# Patient Record
Sex: FEMALE | Race: WHITE | URBAN - METROPOLITAN AREA
[De-identification: names, ages, dates, MRNs, and addresses within clinical notes are randomized per-mention and may not be internally consistent; named-entity substitution may affect disease eponyms.]

---

## 2019-02-05 ENCOUNTER — OFFICE VISIT (OUTPATIENT)
Dept: FAMILY MEDICINE CLINIC | Age: 49
End: 2019-02-05

## 2019-02-05 VITALS
BODY MASS INDEX: 38.95 KG/M2 | OXYGEN SATURATION: 96 % | RESPIRATION RATE: 16 BRPM | SYSTOLIC BLOOD PRESSURE: 117 MMHG | WEIGHT: 263 LBS | TEMPERATURE: 99.3 F | HEART RATE: 107 BPM | DIASTOLIC BLOOD PRESSURE: 82 MMHG | HEIGHT: 69 IN

## 2019-02-05 DIAGNOSIS — R00.0 TACHYCARDIA: ICD-10-CM

## 2019-02-05 DIAGNOSIS — Z00.00 ANNUAL PHYSICAL EXAM: ICD-10-CM

## 2019-02-05 DIAGNOSIS — C50.911 MALIGNANT NEOPLASM OF RIGHT FEMALE BREAST, UNSPECIFIED ESTROGEN RECEPTOR STATUS, UNSPECIFIED SITE OF BREAST (HCC): ICD-10-CM

## 2019-02-05 DIAGNOSIS — F33.1 MODERATE EPISODE OF RECURRENT MAJOR DEPRESSIVE DISORDER (HCC): ICD-10-CM

## 2019-02-05 DIAGNOSIS — G43.109 MIGRAINE WITH AURA AND WITHOUT STATUS MIGRAINOSUS, NOT INTRACTABLE: Primary | ICD-10-CM

## 2019-02-05 RX ORDER — SUMATRIPTAN 20 MG/1
1 SPRAY NASAL AS NEEDED
Qty: 1 CONTAINER | Refills: 2 | Status: SHIPPED | OUTPATIENT
Start: 2019-02-05 | End: 2019-05-21

## 2019-02-05 RX ORDER — CITALOPRAM 10 MG/1
10 TABLET ORAL DAILY
Qty: 90 TAB | Refills: 0 | Status: SHIPPED | OUTPATIENT
Start: 2019-02-05 | End: 2019-04-25 | Stop reason: ALTCHOICE

## 2019-02-05 RX ORDER — TAMOXIFEN CITRATE 20 MG/1
20 TABLET ORAL DAILY
COMMUNITY

## 2019-02-05 NOTE — PROGRESS NOTES
Subjective  Noah Elizalde is an 57 y.o. female who presents for worsening headaches    HPI  Headaches  Duration: adolescence  Type: patient states that she has not been told which type  Location:  Left frontal radiating to back of neck  Severity: can be 9/10 at its worst, no headache right now  Character: sharp  Aura: reports seeing spots just prior to and during headache  Timing: can last up to 4 days, but usually only one day  Frequency: 1x per week  Setting: no particular setting  Triggers: weather changes  Treatment: advil and tylenol  Neurologist: has never been evaluated by neurology in the past    Hx of Breast Cancer  Diagnosed: Nov, 2012  Surgery, Chemo, Radiation  Specialist: followed by [facility], last seen less than 1 month ago (Dr. Ying Avalos)  Treatment: tamoxifen  Reoccurence: denies any hx of reoccurrence after tretament    Depression  Duration: 1 year  Medicine: not taking any medicine for this  Prior Medicines: bupropion in the past, but it was stopped d/t interaction with Tamoxifen. Patient states that she spoke with her oncologist, who stated that she could be on Celexa or Effexor  SI: denies any SI or HI  Symptoms: manifested by fellings of sadness or easily agitated  Effects on Life: affects work life; patient lives by herself  PHQ-9: 10 (moderate depression)  Social Support: patient feels that she has good social support from family (sisters)  Sexual Activity: patient is sexually active and uses paraguard IUD        Review of Systems   Constitutional: Negative for appetite change. Negative for activity change, chills, diaphoresis and fatigue. HENT: Negative for congestion and dental problem. Eyes: Negative for discharge. Respiratory: Negative for apnea and chest tightness. Cardiovascular: Negative for chest pain and leg swelling. Gastrointestinal: Negative for abdominal distention and abdominal pain. Genitourinary: Negative for difficulty urinating and dysuria. Musculoskeletal: Negative for arthralgias and back pain. Skin: Negative for color change and rash. Neurological: Negative for numbness. Positive for headaches. Hematological: Negative for adenopathy. Psychiatric/Behavioral: see HPI        Allergies - reviewed: Allergies   Allergen Reactions    Adhesive Unknown (comments)         Medications - reviewed:   Current Outpatient Medications   Medication Sig    tamoxifen (NOLVADEX) 20 mg tablet Take 20 mg by mouth daily.  SUMAtriptan (IMITREX) 20 mg/actuation nasal spray 1 [place] by Right Nostril route as needed for Migraine.  citalopram (CELEXA) 10 mg tablet Take 1 Tab by mouth daily. No current facility-administered medications for this visit. Past Medical History - reviewed:  Past Medical History:   Diagnosis Date    Breast cancer ([facility] [address] 2011    IN REMISSION    Papanicolaou smear 2013    DR LELO TREADWELL         Past Surgical History - reviewed:   Past Surgical History:   Procedure Laterality Date    HX BREAST LUMPECTOMY      RIGHT    HX CHOLECYSTECTOMY  2016    ND ANESTH, HEART SURG < AGE 3           Social History - reviewed:  Social History     Socioeconomic History    Marital status: SINGLE     Spouse name: Not on file    Number of children: Not on file    Years of education: Not on file    Highest education level: Not on file   Social Needs    Financial resource strain: Not on file    Food insecurity - worry: Not on file    Food insecurity - inability: Not on file   [employer] needs - medical: Not on file   [employer] needs - non-medical: Not on file   Occupational History    Not on file   Tobacco Use    Smoking status: Current Every Day Smoker    Smokeless tobacco: Never Used   Substance and Sexual Activity    Alcohol use:  Yes    Drug use: No    Sexual activity: Not on file   Other Topics Concern    Not on file   Social History Narrative    Not on file         Family History - reviewed:  Family History   Problem Relation Age of Onset    Hypertension Mother     Thyroid Disease Mother         hypothyroidism    Coronary Artery Disease Father     Diabetes Father          Visit Vitals  /82   Pulse (!) 107   Temp 99.3 °F (37.4 °C) (Oral)   Resp 16   Ht 5' 9\" (1.753 m)   Wt 263 lb (119.3 kg)   SpO2 96%   BMI 38.84 kg/m²       Physical Exam   Constitutional: well-developed and well-nourished. HENT:   Head: Normocephalic and atraumatic. Eyes: Conjunctivae are normal. Pupils are equal, round, and reactive to light. Neck: Normal range of motion. Neck supple. No JVD present. No tracheal deviation present. No thyromegaly present. Cardiovascular: Normal rate, regular rhythm and normal heart sounds. Exam reveals no friction rub. No murmur heard. Pulmonary/Chest: Effort normal and breath sounds normal. No stridor. No respiratory distress. no wheezes. no rales. no tenderness. Abdominal: Soft. Bowel sounds are normal. no distension and no mass. no tenderness. There is no rebound and no guarding. Musculoskeletal: Normal range of motion. no edema, tenderness or deformity. Lymphadenopathy:    no cervical adenopathy. Neurological: No cranial nerve deficit. Coordination normal.   Skin: Skin is warm and dry. No erythema. Psychiatric: normal mood and affect. Assessment/Plan  1. Migraine with aura and without status migrainosus, not intractable: no current headaches. Migraines have not responded to NSAID or tylenol in the past. Will escalate therapy with Imitrex.   - SUMAtriptan (IMITREX) 20 mg/actuation nasal spray; 1 [place] by Right Nostril route as needed for Migraine. Dispense: 1 Container; Refill: 2  - discussed reasons to call or go to ED    2. Moderate episode of recurrent major depressive disorder ([facility] [address]): stable no SI. Patient unable to stay on Bupropion d/t interaction with Tamoxifen, but no interaction with Celexa or Effexor. Will start Celexa and reassess in two weeks.     - citalopram (CELEXA) 10 mg tablet; Take 1 Tab by mouth daily. Dispense: 90 Tab; Refill: 0    3. Malignant neoplasm of right female breast, unspecified estrogen receptor status, unspecified site of breast ([facility] [address]): stable with no report of reoccurrence. - continue tamoxifen per [facility]  - f/u with [facility] oncology as scheduled    4. Annual physical exam: patient due for annual physical. Will obtain labs and RTC in 2 weeks for annual.   - CBC WITH AUTOMATED DIFF  - METABOLIC PANEL, COMPREHENSIVE  - LIPID PANEL    5. Tachycardia: patient with HR elevated at 107 on encounter today. Patient denies any CP, dizziness, syncope/presyncope, or palpitations. Review of prior records shows that patient has had slightly elevated HR in the past. Will continue to monitor with consideration of further workup based on symptoms. - discussed reasons to call or go to ED      Follow-up Disposition:  Return in about 2 weeks (around 2/19/2019) for annual physical.      I have discussed the diagnosis with the patient and the intended plan as seen in the above orders. Patient verbalized understanding of the plan and agrees with the plan. The patient has received an after-visit summary and questions were answered concerning future plans. I have discussed medication side effects and warnings with the patient as well. Informed patient to return to the office if new symptoms arise.         Daniel Neumann MD  Family Medicine Resident

## 2019-02-05 NOTE — PATIENT INSTRUCTIONS
Recurring Migraine Headache: Care Instructions  Your Care Instructions  Migraines are painful, throbbing headaches. They often start on one side of the head. They may cause nausea and vomiting and make you sensitive to light, sound, or smell. Some people may have only a few migraines throughout life. Others have them as often as several times a month. The goal of treatment is to reduce the number of migraines you have and relieve your symptoms. Even with treatment, you may continue to have migraines. You play an important role in dealing with your headaches. Work on avoiding things that seem to trigger your migraines. When you feel a headache coming on, act quickly to stop it before it gets worse. Follow-up care is a key part of your treatment and safety. Be sure to make and go to all appointments, and call your doctor if you are having problems. It's also a good idea to know your test results and keep a list of the medicines you take. How can you care for yourself at home? · Do not drive if you have taken a prescription pain medicine. · Rest in a quiet, dark room until your headache is gone. Close your eyes and try to relax or go to sleep. Do not watch TV or read. · Put a cold, moist cloth or cold pack on the painful area for 10 to 20 minutes at a time. Put a thin cloth between the cold pack and your skin. · Have someone gently massage your neck and shoulders. · Take your medicines exactly as prescribed. Call your doctor if you think you are having a problem with your medicine. You will get more details on the specific medicines your doctor prescribes. To prevent migraines  · Keep a headache diary so you can figure out what triggers your headaches. Avoiding triggers may help you prevent headaches. Record when each headache began, how long it lasted, and what the pain was like. Use words like throbbing, aching, stabbing, or dull. Write down any other symptoms you had with the headache.  These may include nausea, flashing lights or dark spots, or sensitivity to bright light or loud noise. Note if the headache occurred near your period. List anything that might have triggered the headache. Triggers may include certain foods (chocolate, cheese, wine) or odors, smoke, bright light, stress, or lack of sleep. · If your doctor has prescribed medicine for your migraines, take it as directed. You may have medicine that you take only when you get a migraine and medicine that you take all the time to help prevent migraines. ? If your doctor has prescribed medicine for when you get a headache, take it at the first sign of a migraine, unless your doctor has given you other instructions. ? If your doctor has prescribed medicine to prevent migraines, take it exactly as prescribed. Call your doctor if you think you are having a problem with your medicine. · Find healthy ways to deal with stress. Migraines are most common during or right after stressful times. Take time to relax before and after you do something that has caused a migraine in the past.  · Try to keep your muscles relaxed by keeping good posture. Check your jaw, face, neck, and shoulder muscles for tension. Try to relax them. When sitting at a desk, change positions often. Stretch for 30 seconds each hour. · Get regular sleep and exercise. · Eat regular meals, and avoid foods and drinks that often trigger migraines. These include chocolate and alcohol, especially red wine and port. Chemicals used in food, such as aspartame and monosodium glutamate (MSG), also can trigger migraines. So can some food additives, such as those found in hot dogs, bonilla, cold cuts, aged cheeses, and pickled foods. · Limit caffeine by not drinking too much coffee, tea, or soda. Do not quit caffeine suddenly, because that can also give you migraines. · Do not smoke or allow others to smoke around you.  If you need help quitting, talk to your doctor about stop-smoking programs and medicines. These can increase your chances of quitting for good. · If you are taking birth control pills or hormone therapy, talk to your doctor about whether they are triggering your migraines. When should you call for help? Call 911 anytime you think you may need emergency care. For example, call if:    · You have symptoms of a stroke. These may include:  ? Sudden numbness, tingling, weakness, or loss of movement in your face, arm, or leg, especially on only one side of your body. ? Sudden vision changes. ? Sudden trouble speaking. ? Sudden confusion or trouble understanding simple statements. ? Sudden problems with walking or balance. ? A sudden, severe headache that is different from past headaches.    Call your doctor now or seek immediate medical care if:    · You develop a fever and a stiff neck.     · You have new nausea and vomiting, or you cannot keep down food or liquids.    Watch closely for changes in your health, and be sure to contact your doctor if:    · You have a headache that does not get better within 1 or 2 days.     · Your headaches get worse or happen more often. Where can you learn more? Go to http://tashi-pamela.info/. Enter V975 in the search box to learn more about \"Recurring Migraine Headache: Care Instructions. \"  Current as of: Gris 3, 2018  Content Version: 11.9  © 2987-2001 [employer]. Care instructions adapted under license by [employer] (which disclaims liability or warranty for this information). If you have questions about a medical condition or this instruction, always ask your healthcare professional. [place] [address] any warranty or liability for your use of this information. Deciding About Taking Medicine to Prevent Migraines  How can you decide about taking medicine to prevent migraine headaches? What are migraines? Migraines are painful, throbbing headaches.  They can last from 4 to 67 hours. They often occur on only one side of your head. But you may feel them on both sides. The pain may keep you from doing your daily activities. You may take a daily medicine if you get bad migraines often. This can help prevent them. What are key points about this decision? · Medicines to prevent migraines may not stop them every time. But if you take them daily, you can reduce how many migraines you get by more than half. They can also reduce how long migraines last. And your symptoms may not be as bad. · Medicines that prevent migraines may cause side effects. You may have sleep and memory problems, upset stomach, dry mouth, or constipation. Some of these side effects may last for as long as you take the medicine. Or they may go away within a few weeks. Why might you choose to take medicine to prevent migraines? · You are willing to take medicine daily if it will help your symptoms. · You don't think the side effects of the medicine could be as bad as your migraine symptoms. · Your migraines get in the way of your work. Or they harm your relationships with friends and family. · Benefits of medicine include fewer or no migraines. And your migraines may not last as long or feel as bad. Why might you choose not to take medicine to prevent migraines? · You want to avoid the side effects of the medicine. · You don't want to take medicine every day. · Your migraines are not affecting your work and relationships. · If your symptoms don't improve with home treatment and other medicines, you can decide later to take medicine every day to help prevent migraines. Your decision  Thinking about the facts and your feelings can help you make a decision that is right for you. Be sure you understand the benefits and risks of your options, and think about what else you need to do before you make the decision. Where can you learn more? Go to http://tashi-pamela.info/.   Enter X329 in the search box to learn more about \"Deciding About Taking Medicine to Prevent Migraines. \"  Current as of: Gris 3, 2018  Content Version: 11.9  © 6990-8588 [employer]. Care instructions adapted under license by [employer] (which disclaims liability or warranty for this information). If you have questions about a medical condition or this instruction, always ask your healthcare professional. [address] any warranty or liability for your use of this information. Sumatriptan (By mouth)   Sumatriptan (laquita-ma-TRIP-tan)  Treats migraine headaches. Brand Name(s): Imitrex, Migraine Pack, Migranow   There may be other brand names for this medicine. When This Medicine Should Not Be Used: This medicine is not right for everyone. Do not use if you had an allergic reaction to sumatriptan. Tell your doctor if you have heart or blood vessel problems, such as a history of heart attack, stroke, or heart rhythm problems. How to Use This Medicine:   Tablet  · Your doctor will tell you how much medicine to use. Do not use more than directed. · Swallow the tablet whole with water or other liquids. Do not crush, break, or chew it. · If this medicine does not help your headache at all, do not take more medicine. Call your doctor. · If your headache comes back or you do not get complete relief, wait at least 2 hours before you use another dose. If you feel you need to use the medicine more than 2 times in a day, call your doctor. · Read and follow the patient instructions that come with this medicine. Talk to your doctor or pharmacist if you have any questions. · Use sumatriptan only when you have a migraine. This medicine is not used on a regular schedule. · Store the medicine in a closed container at room temperature, away from heat, moisture, and direct light.   Drugs and Foods to Avoid:   Ask your doctor or pharmacist before using any other medicine, including over-the-counter medicines, vitamins, and herbal products. · Do not use this medicine if you have taken another migraine headache medicine in the past 24 hours, such as another triptan or an ergot medicine. These medicines include almotriptan, dihydroergotamine, eletriptan, ergotamine, frovatriptan, methysergide, rizatriptan, or zolmitriptan. · Do not use this medicine if you have taken an MAO inhibitor in the past 14 days. · Tell your doctor if you are using medicine to treat depression. Warnings While Using This Medicine:   · Tell your doctor if you are pregnant, or if you have kidney disease, liver disease, diabetes, high blood pressure, high cholesterol, or a history of seizures. Tell your doctor if you have a family history of heart disease, a history of blood circulation problems (such as peripheral vascular disease), or if you smoke. · Do not breastfeed for at least 12 hours after you take this medicine. · This medicine should be used only for classic or common migraine headaches. It will not work for any other kind of headache or pain. · This medicine may cause the following problems:  ¨ Higher risk for abnormal heart rhythm, heart attack, or stroke  ¨ Tightness or discomfort in your chest, neck, or jaw  ¨ Spasms in the blood vessels, including Raynaud syndrome  ¨ Serotonin syndrome (more likely if used with medicine to treat depression)  ¨ High blood pressure  · Your headaches may become worse if you use this medicine for 10 or more days per month. Keep a journal and write down how often your headaches occur and how often you take this medicine. · This medicine may make you dizzy or drowsy. Do not drive or do anything else that could be dangerous until you know how this medicine affects you. · Call your doctor if your symptoms do not improve or if they get worse. · Keep all medicine out of the reach of children. Never share your medicine with anyone.   Possible Side Effects While Using This Medicine:   Call your doctor right away if you notice any of these side effects:  · Allergic reaction: Itching or hives, swelling in your face or hands, swelling or tingling in your mouth or throat, chest tightness, trouble breathing  · Anxiety, restlessness, fever, sweating, muscle spasms, twitching, diarrhea, seeing or hearing things that are not there  · Chest pain, especially if it spreads to your arms, jaw, back, or neck, trouble breathing, unusual sweating, faintness  · Fast, pounding, or uneven heartbeat, dizziness  · Numbness, tingling, cramps, unexplained pain in your hands, arms, legs, or feet, color changes in your fingers or toes  · Seizure  · Severe stomach pain, bloody diarrhea, nausea, or vomiting  · Sudden severe headache (other than the one being treated)  · Tightness or discomfort in your chest, neck, or jaw  · Vision loss or vision changes that are not part of a usual migraine  If you notice other side effects that you think are caused by this medicine, tell your doctor. Call your doctor for medical advice about side effects. You may report side effects to FDA at 7-559-FDA-1230  © 2017 [address]  Information is for End User's use only and may not be sold, redistributed or otherwise used for commercial purposes. The above information is an  only. It is not intended as medical advice for individual conditions or treatments. Talk to your doctor, nurse or pharmacist before following any medical regimen to see if it is safe and effective for you.

## 2019-02-05 NOTE — PROGRESS NOTES
Chief Complaint   Patient presents with    Headache     HX OF H/A     GETTING WORSE OR MORE FREQUENTLY    \"NOT SURE IF IT IS BECAUSE OF THE DEPRESSION\" PER MS. Mike Perales Maintenance Due   Topic    DTaP/Tdap/Td series (1 - Tdap)    PAP AKA CERVICAL CYTOLOGY     Influenza Age 5 to Adult        Wt Readings from Last 3 Encounters:   02/05/19 263 lb (119.3 kg)     Temp Readings from Last 3 Encounters:   02/05/19 99.3 °F (37.4 °C) (Oral)     BP Readings from Last 3 Encounters:   02/05/19 117/82     Pulse Readings from Last 3 Encounters:   02/05/19 (!) 107         Learning Assessment:  :     No flowsheet data found. Depression Screening:  :     PHQ over the last two weeks 2/5/2019   Little interest or pleasure in doing things Nearly every day   Feeling down, depressed, irritable, or hopeless Not at all   Total Score PHQ 2 3   Trouble falling or staying asleep, or sleeping too much Several days   Feeling tired or having little energy Several days   Poor appetite, weight loss, or overeating Several days   Feeling bad about yourself - or that you are a failure or have let yourself or your family down Several days   Trouble concentrating on things such as school, work, reading, or watching TV Several days   Moving or speaking so slowly that other people could have noticed; or the opposite being so fidgety that others notice Not at all   Thoughts of being better off dead, or hurting yourself in some way Not at all   PHQ 9 Score 8   How difficult have these problems made it for you to do your work, take care of your home and get along with others Somewhat difficult       Fall Risk Assessment:  :     No flowsheet data found. Abuse Screening:  :     No flowsheet data found. Coordination of Care Questionnaire:  :     1) Have you been to an emergency room, urgent care clinic since your last visit? NO   Hospitalized since your last visit?  NO             2) Have you seen or consulted any other health care providers outside of [address] [place] [address] since your last visit? NO    Patient is accompanied by self I have received verbal consent from Ashlee Rangel to discuss any/all medical information while they are present in the room.

## 2019-02-08 NOTE — PROGRESS NOTES
[address] Medicine Residency Attending Addendum:  Patient encounter was discussed on the day of the encounter with Charmayne China, MD, performing the key elements of the service. I discussed the findings, assessment and plan with the resident and agree with the resident's findings and plan as documented in the resident's note.       Sha Burks MD, CAQSM, RMSK

## 2019-02-19 LAB
ALBUMIN SERPL-MCNC: 3.8 G/DL (ref 3.5–5.5)
ALBUMIN/GLOB SERPL: 1.3 {RATIO} (ref 1.2–2.2)
ALP SERPL-CCNC: 78 IU/L (ref 39–117)
ALT SERPL-CCNC: 15 IU/L (ref 0–32)
AST SERPL-CCNC: 19 IU/L (ref 0–40)
BASOPHILS # BLD AUTO: 0 X10E3/UL (ref 0–0.2)
BASOPHILS NFR BLD AUTO: 0 %
BILIRUB SERPL-MCNC: 0.2 MG/DL (ref 0–1.2)
BUN SERPL-MCNC: 8 MG/DL (ref 6–24)
BUN/CREAT SERPL: 10 (ref 9–23)
CALCIUM SERPL-MCNC: 8.9 MG/DL (ref 8.7–10.2)
CHLORIDE SERPL-SCNC: 105 MMOL/L (ref 96–106)
CHOLEST SERPL-MCNC: 167 MG/DL (ref 100–199)
CO2 SERPL-SCNC: 21 MMOL/L (ref 20–29)
CREAT SERPL-MCNC: 0.84 MG/DL (ref 0.57–1)
EOSINOPHIL # BLD AUTO: 0.1 X10E3/UL (ref 0–0.4)
EOSINOPHIL NFR BLD AUTO: 2 %
ERYTHROCYTE [DISTWIDTH] IN BLOOD BY AUTOMATED COUNT: 14.5 % (ref 12.3–15.4)
GLOBULIN SER CALC-MCNC: 3 G/DL (ref 1.5–4.5)
GLUCOSE SERPL-MCNC: 97 MG/DL (ref 65–99)
HCT VFR BLD AUTO: 39.9 % (ref 34–46.6)
HDLC SERPL-MCNC: 60 MG/DL
HGB BLD-MCNC: 13.2 G/DL (ref 11.1–15.9)
IMM GRANULOCYTES # BLD AUTO: 0 X10E3/UL (ref 0–0.1)
IMM GRANULOCYTES NFR BLD AUTO: 0 %
LDLC SERPL CALC-MCNC: 88 MG/DL (ref 0–99)
LYMPHOCYTES # BLD AUTO: 1.7 X10E3/UL (ref 0.7–3.1)
LYMPHOCYTES NFR BLD AUTO: 27 %
MCH RBC QN AUTO: 29.7 PG (ref 26.6–33)
MCHC RBC AUTO-ENTMCNC: 33.1 G/DL (ref 31.5–35.7)
MCV RBC AUTO: 90 FL (ref 79–97)
MONOCYTES # BLD AUTO: 0.3 X10E3/UL (ref 0.1–0.9)
MONOCYTES NFR BLD AUTO: 4 %
NEUTROPHILS # BLD AUTO: 4.4 X10E3/UL (ref 1.4–7)
NEUTROPHILS NFR BLD AUTO: 67 %
PLATELET # BLD AUTO: 244 X10E3/UL (ref 150–379)
POTASSIUM SERPL-SCNC: 4.4 MMOL/L (ref 3.5–5.2)
PROT SERPL-MCNC: 6.8 G/DL (ref 6–8.5)
RBC # BLD AUTO: 4.45 X10E6/UL (ref 3.77–5.28)
SODIUM SERPL-SCNC: 137 MMOL/L (ref 134–144)
TRIGL SERPL-MCNC: 96 MG/DL (ref 0–149)
VLDLC SERPL CALC-MCNC: 19 MG/DL (ref 5–40)
WBC # BLD AUTO: 6.5 X10E3/UL (ref 3.4–10.8)

## 2019-02-20 ENCOUNTER — OFFICE VISIT (OUTPATIENT)
Dept: FAMILY MEDICINE CLINIC | Age: 49
End: 2019-02-20

## 2019-02-20 VITALS
SYSTOLIC BLOOD PRESSURE: 133 MMHG | HEIGHT: 69 IN | DIASTOLIC BLOOD PRESSURE: 85 MMHG | WEIGHT: 264 LBS | OXYGEN SATURATION: 96 % | HEART RATE: 83 BPM | TEMPERATURE: 98 F | RESPIRATION RATE: 16 BRPM | BODY MASS INDEX: 39.1 KG/M2

## 2019-02-20 DIAGNOSIS — Z12.4 SCREENING FOR CERVICAL CANCER: ICD-10-CM

## 2019-02-20 DIAGNOSIS — Z72.0 TOBACCO USE: ICD-10-CM

## 2019-02-20 DIAGNOSIS — G43.109 MIGRAINE WITH AURA AND WITHOUT STATUS MIGRAINOSUS, NOT INTRACTABLE: ICD-10-CM

## 2019-02-20 DIAGNOSIS — Z00.00 ANNUAL PHYSICAL EXAM: Primary | ICD-10-CM

## 2019-02-20 NOTE — PATIENT INSTRUCTIONS
Stopping Smoking: Care Instructions  Your Care Instructions  Cigarette smokers crave the nicotine in cigarettes. Giving it up is much harder than simply changing a habit. Your body has to stop craving the nicotine. It is hard to quit, but you can do it. There are many tools that people use to quit smoking. You may find that combining tools works best for you. There are several steps to quitting. First you get ready to quit. Then you get support to help you. After that, you learn new skills and behaviors to become a nonsmoker. For many people, a necessary step is getting and using medicine. Your doctor will help you set up the plan that best meets your needs. You may want to attend a smoking cessation program to help you quit smoking. When you choose a program, look for one that has proven success. Ask your doctor for ideas. You will greatly increase your chances of success if you take medicine as well as get counseling or join a cessation program.  Some of the changes you feel when you first quit tobacco are uncomfortable. Your body will miss the nicotine at first, and you may feel short-tempered and grumpy. You may have trouble sleeping or concentrating. Medicine can help you deal with these symptoms. You may struggle with changing your smoking habits and rituals. The last step is the tricky one: Be prepared for the smoking urge to continue for a time. This is a lot to deal with, but keep at it. You will feel better. Follow-up care is a key part of your treatment and safety. Be sure to make and go to all appointments, and call your doctor if you are having problems. It's also a good idea to know your test results and keep a list of the medicines you take. How can you care for yourself at home? · Ask your family, friends, and coworkers for support. You have a better chance of quitting if you have help and support.   · Join a support group, such as Nicotine Anonymous, for people who are trying to quit smoking. · Consider signing up for a smoking cessation program, such as the American Lung Association's Freedom from Smoking program.  · Get text messaging support. Go to the website at www.smokefree. gov to sign up for the [facility] program.  · Set a quit date. Pick your date carefully so that it is not right in the middle of a big deadline or stressful time. Once you quit, do not even take a puff. Get rid of all ashtrays and lighters after your last cigarette. Clean your house and your clothes so that they do not smell of smoke. · Learn how to be a nonsmoker. Think about ways you can avoid those things that make you reach for a cigarette. ? Avoid situations that put you at greatest risk for smoking. For some people, it is hard to have a drink with friends without smoking. For others, they might skip a coffee break with coworkers who smoke. ? Change your daily routine. Take a different route to work or eat a meal in a different place. · Cut down on stress. Calm yourself or release tension by doing an activity you enjoy, such as reading a book, taking a hot bath, or gardening. · Talk to your doctor or pharmacist about nicotine replacement therapy, which replaces the nicotine in your body. You still get nicotine but you do not use tobacco. Nicotine replacement products help you slowly reduce the amount of nicotine you need. These products come in several forms, many of them available over-the-counter:  ? Nicotine patches  ? Nicotine gum and lozenges  ? Nicotine inhaler  · Ask your doctor about bupropion (Wellbutrin) or varenicline (Chantix), which are prescription medicines. They do not contain nicotine. They help you by reducing withdrawal symptoms, such as stress and anxiety. · Some people find hypnosis, acupuncture, and massage helpful for ending the smoking habit. · Eat a healthy diet and get regular exercise. Having healthy habits will help your body move past its craving for nicotine.   · Be prepared to keep trying. Most people are not successful the first few times they try to quit. Do not get mad at yourself if you smoke again. Make a list of things you learned and think about when you want to try again, such as next week, next month, or next year. Where can you learn more? Go to http://tashi-pamela.info/. Enter L087 in the search box to learn more about \"Stopping Smoking: Care Instructions. \"  Current as of: September 26, 2018  Content Version: 11.9  © 3859-5039 [employer]. Care instructions adapted under license by [employer] (which disclaims liability or warranty for this information). If you have questions about a medical condition or this instruction, always ask your healthcare professional. [address] any warranty or liability for your use of this information. Deciding About Using Medicines To Quit Smoking  How can you decide about using medicines to quit smoking? What are the medicines you can use? Your doctor may prescribe varenicline (Chantix) or bupropion (Zyban). These medicines can help you cope with cravings for tobacco. They are pills that don't contain nicotine. You also can use nicotine replacement products. These do contain nicotine. There are many types. · Gum and lozenges slowly release nicotine into your mouth. · Patches stick to your skin. They slowly release nicotine into your bloodstream.  · An inhaler has a bauer that contains nicotine. You breathe in a puff of nicotine vapor through your mouth and throat. · Nasal spray releases a mist that contains nicotine. What are key points about this decision? · Using medicines can double your chances of quitting smoking. They can ease cravings and withdrawal symptoms. · Getting counseling along with using medicine can raise your chances of quitting even more.   · If you smoke fewer than 5 cigarettes a day, you may not need medicines to help you quit smoking. · These medicines have less nicotine than cigarettes. And by itself, nicotine is not nearly as harmful as smoking. The tars, carbon monoxide, and other toxic chemicals in tobacco cause the harmful effects. · The side effects of nicotine replacement products depend on the type of product. For example, a patch can make your skin red and itchy. Medicines in pill form can make you sick to your stomach. They can also cause dry mouth and trouble sleeping. For most people, the side effects are not bad enough to make them stop using the products. Why might you choose to use medicines to quit smoking? · You have tried on your own to stop smoking, but you were not able to stop. · You smoke more than 5 cigarettes a day. · You want to increase your chances of quitting smoking. · You want to reduce your cravings and withdrawal symptoms. · You feel the benefits of medicine outweigh the side effects. Why might you choose not to use medicine? · You want to try quitting on your own by stopping all at once (\"cold turkey\"). · You want to cut back slowly on the number of cigarettes you smoke. · You smoke fewer than 5 cigarettes a day. · You do not like using medicine. · You feel the side effects of medicines outweigh the benefits. · You are worried about the cost of medicines. Your decision  Thinking about the facts and your feelings can help you make a decision that is right for you. Be sure you understand the benefits and risks of your options, and think about what else you need to do before you make the decision. Where can you learn more? Go to http://tashi-pamela.info/. Enter Q162 in the search box to learn more about \"Deciding About Using Medicines To Quit Smoking. \"  Current as of: September 26, 2018  Content Version: 11.9  © 1684-4336 [employer].  Care instructions adapted under license by [employer] (which disclaims liability or warranty for this information). If you have questions about a medical condition or this instruction, always ask your healthcare professional. [address] any warranty or liability for your use of this information. Learning About Benefits From Quitting Smoking  How does quitting smoking make you healthier? If you're thinking about quitting smoking, you may have a few reasons to be smoke-free. Your health may be one of them. · When you quit smoking, you lower your risks for cancer, lung disease, heart attack, stroke, blood vessel disease, and blindness from macular degeneration. · When you're smoke-free, you get sick less often, and you heal faster. You are less likely to get colds, flu, bronchitis, and pneumonia. · As a nonsmoker, you may find that your mood is better and you are less stressed. When and how will you feel healthier? Quitting has real health benefits that start from day 1 of being smoke-free. And the longer you stay smoke-free, the healthier you get and the better you feel. The first hours  · After just 20 minutes, your blood pressure and heart rate go down. That means there's less stress on your heart and blood vessels. · Within 12 hours, the level of carbon monoxide in your blood drops back to normal. That makes room for more oxygen. With more oxygen in your body, you may notice that you have more energy than when you smoked. After 2 weeks  · Your lungs start to work better. · Your risk of heart attack starts to drop. After 1 month  · When your lungs are clear, you cough less and breathe deeper, so it's easier to be active. · Your sense of taste and smell return. That means you can enjoy food more than you have since you started smoking. Over the years  · After 1 year, your risk of heart disease is half what it would be if you kept smoking. · After 5 years, your risk of stroke starts to shrink.  Within a few years after that, it's about the same as if you'd never smoked. · After 10 years, your risk of dying from lung cancer is cut by about half. And your risk for many other types of cancer is lower too. How would quitting help others in your life? When you quit smoking, you improve the health of everyone who now breathes in your smoke. · Their heart, lung, and cancer risks drop, much like yours. · They are sick less. For babies and small children, living smoke-free means they're less likely to have ear infections, pneumonia, and bronchitis. · If you're a woman who is or will be pregnant someday, quitting smoking means a healthier . · Children who are close to you are less likely to become adult smokers. Where can you learn more? Go to http://tashi-pamela.info/. Enter 052 [id number] [id number] in the search box to learn more about \"Learning About Benefits From Quitting Smoking. \"  Current as of: 2018  Content Version: 11.9  © 3967-9175 [employer]. Care instructions adapted under license by [employer] (which disclaims liability or warranty for this information). If you have questions about a medical condition or this instruction, always ask your healthcare professional. [address] any warranty or liability for your use of this information.

## 2019-02-20 NOTE — PROGRESS NOTES
Sabiha Martinez is a 57 y.o. female      Chief Complaint   Patient presents with    Follow-up     Depression         1. Have you been to the ER, urgent care clinic since your last visit? Hospitalized since your last visit?  no      2. Have you seen or consulted any other health care providers outside of the [facility] since your last visit? Include any pap smears or colon screening.   No

## 2019-02-20 NOTE — PROGRESS NOTES
Subjective  Marcelino Degroot is an 57 y.o. female who presents for annual physical and follow up of depression and headaches    HPI  Annual Physical  Last Physical: unsure  Immunizations  - due for pneumonia vaccine  [facility]  -PAP: last performed 3-4 years ago, results unavailable   -Mammogram: performed June/July 2018 at [facility] (followed by [facility] for hx of breast cancer)  Diet: has been trying to eat more vegetables and fewer carbs, staying away from sugars  Exercise: does Yoga occasionally, walks 2x per week    HPI  Headaches  Duration: adolescence  Type: patient states that she has not been told which type  Location:  Left frontal radiating to back of neck  Severity: can be 9/10 at it's worst, no headache right now  Character: sharp  Aura: reports seeing spots just prior to and during headache  Timing: can last up to 4 days, but usually only one day  Frequency: 1x per week  Setting: no particular setting  Triggers: weather changes  Treatment: advil and tylenol, patient was recently prescribed imitrex, which initially helped her but then did not seem  To help  Neurologist: has never been evaluated by neurology in the past  Interval History:  2/20/2019: Patient last seen for symptoms on 2/7/2019. At that time she was prescribed Imitrex for PRN use. Patient reports that initially the Imitrex helped, but she does not fell that it is completely effective.      Depression  Duration: 1 year  Medicine: recenly started on Celexa  Prior Medicines: bupropion in the past, but it was stopped d/t interaction with Tamoxifen.  Patient states that she spoke with her oncologist, who stated that she could be on Celexa or Effexor  SI: denies any SI or HI  Symptoms: manifested by fellings of sadness or easily agitated  Effects on Life: affects work life; patient lives by herself  PHQ-9: 10 (moderate depression)  Social Support: patient feels that she has good social support from family (sisters)  Sexual Activity: patient is sexually active and uses paraguard IUD (reports that she is due for removal in 2021)  Interval History:  2/20/2019: patient was recently seen on 2/72019. At that time she was placed on Celexa. She reports that she has not noticed in change in her symptoms. She denies any SI or worsening in symptoms. Tobacco Use:  Tb: smokes less than 1 PPD, has tried quitting in the past but was unsuccessful due to stress. States that she would like to quit. States that she has used acupuncture in the past, which she endorses was very helpful. She also reports using Chantix, which she states did not help      Review of Systems   Constitutional: Negative for appetite change. Negative for activity change, chills, diaphoresis and fatigue. Cardio: denies any CP or palpitations  Resp: denies any dyspnea or wheezing         Allergies - reviewed: Allergies   Allergen Reactions    Adhesive Unknown (comments)         Medications - reviewed:   Current Outpatient Medications   Medication Sig    pneumococcal 23-valent (PNEUMOVAX 23) 25 mcg/0.5 mL injection 0.5 mL by IntraMUSCular route once for 1 dose.  tamoxifen (NOLVADEX) 20 mg tablet Take 20 mg by mouth daily.  SUMAtriptan (IMITREX) 20 mg/actuation nasal spray 1 Aimwell by Right Nostril route as needed for Migraine.  citalopram (CELEXA) 10 mg tablet Take 1 Tab by mouth daily. No current facility-administered medications for this visit.           Past Medical History - reviewed:  Past Medical History:   Diagnosis Date    Breast cancer ([facility] [address] 2011    IN REMISSION    Papanicolaou smear 2013    DR LIND        ABNORMAL         Past Surgical History - reviewed:   Past Surgical History:   Procedure Laterality Date    HX BREAST LUMPECTOMY      RIGHT    HX CHOLECYSTECTOMY  2016    CA ANESTH, HEART SURG < AGE 3           Social History - reviewed:  Social History     Socioeconomic History    Marital status: SINGLE     Spouse name: Not on file    Number of children: Not on file    Years of education: Not on file    Highest education level: Not on file   Social Needs    Financial resource strain: Not on file    Food insecurity - worry: Not on file    Food insecurity - inability: Not on file    Transportation needs - medical: Not on file    Transportation needs - non-medical: Not on file   Occupational History    Not on file   Tobacco Use    Smoking status: Current Every Day Smoker    Smokeless tobacco: Never Used   Substance and Sexual Activity    Alcohol use: No     Frequency: Never    Drug use: No    Sexual activity: No   Other Topics Concern    Not on file   Social History Narrative    Not on file         Family History - reviewed:  Family History   Problem Relation Age of Onset    Hypertension Mother     Thyroid Disease Mother         hypothyroidism    Coronary Artery Disease Father     Diabetes Father          Visit Vitals  /85 (BP 1 Location: Left arm, BP Patient Position: Sitting)   Pulse 83   Temp 98 °F (36.7 °C) (Oral)   Resp 16   Ht 5' 9\" (1.753 m)   Wt 264 lb (119.7 kg)   SpO2 96%   BMI 38.99 kg/m²       Physical Exam   Constitutional: well-developed and well-nourished. HENT:   Head: Normocephalic and atraumatic. Eyes: Conjunctivae are normal. Pupils are equal, round, and reactive to light. Neck: Normal range of motion. Neck supple. No JVD present. No tracheal deviation present. No thyromegaly present. Cardiovascular: Normal rate, regular rhythm and normal heart sounds. Exam reveals no friction rub. No murmur heard. Pulmonary/Chest: Effort normal and breath sounds normal. No stridor. No respiratory distress. no wheezes. no rales. no tenderness. Abdominal: Soft. Bowel sounds are normal. no distension and no mass. no tenderness. There is no rebound and no guarding. Musculoskeletal: Normal range of motion. no edema, tenderness or deformity. Lymphadenopathy:    no cervical adenopathy. Neurological: No cranial nerve deficit.  Coordination normal.   Skin: Skin is warm and dry. No erythema. Psychiatric: normal mood and affect. Pelvic Exam: External genitalia is without lesions. Introitus is normal. Vaginal walls are pink and moist without lesions or evidence of trauma. There is no cervical motion tenderness, and the adnexa are without masses. There is no abnormal discharge from the cervix. Assessment/Plan  1. Annual physical exam: reviewed recent labs with patient with no immediate concerns identified. Performed PAP as results from prior PAP are not available for review. Patient followed by [facility] for hx of breast Ca with last Mammogram wnl in July, 2018.   -Discussed recommendations for diet, exercise, and other age appropriate recommendations  - patient due for pneumonia vaccine given smoking status, will prescribe    2. Screening for cervical cancer  - PAP IGP,APTIMA CTNG AGE GDLN    3. Tobacco use: discussed options for quitting with patient. She states that she would like to try acupuncture as it has been effective for her in the past. Provided patient with name of local acupuncturist.    - pneumococcal 23-valent (PNEUMOVAX 23) 25 mcg/0.5 mL injection; 0.5 mL by IntraMUSCular route once for 1 dose. Dispense: 0.5 mL; Refill: 0    4. Migraine with aura and without status migrainosus, not intractable: patient continues to have frequent headaches that are not controlled with Imitrex. Will refer to neurology for further evaluation and management.   - REFERRAL TO NEUROLOGY          Follow-up Disposition:  Return in about 4 weeks (around 3/20/2019) for depression. I have discussed the diagnosis with the patient and the intended plan as seen in the above orders. Patient verbalized understanding of the plan and agrees with the plan. The patient has received an after-visit summary and questions were answered concerning future plans. I have discussed medication side effects and warnings with the patient as well.  Informed patient to return to the office if new symptoms arise.         Severino Frost MD  Family Medicine Resident

## 2019-02-20 NOTE — LETTER
2/20/2019 8:31 AM 
 
Ms. Sabiha Martinez [address] [facility] [place] [facility] [id number] Dear Sabiha Martinez: REVIEWED IN CLINIC WITH PATIENT Daniel Neumann MD 
 
 
Please find your most recent results below. Resulted Orders CBC WITH AUTOMATED DIFF Result Value Ref Range WBC 6.5 3.4 - 10.8 x10E3/uL  
 RBC 4.45 3.77 - 5.28 x10E6/uL HGB 13.2 11.1 - 15.9 g/dL HCT 39.9 34.0 - 46.6 % MCV 90 79 - 97 fL  
 MCH 29.7 26.6 - 33.0 pg  
 MCHC 33.1 31.5 - 35.7 g/dL  
 RDW 14.5 12.3 - 15.4 % PLATELET 157 301 - 214 x10E3/uL NEUTROPHILS 67 Not Estab. % Lymphocytes 27 Not Estab. % MONOCYTES 4 Not Estab. % EOSINOPHILS 2 Not Estab. % BASOPHILS 0 Not Estab. %  
 ABS. NEUTROPHILS 4.4 1.4 - 7.0 x10E3/uL Abs Lymphocytes 1.7 0.7 - 3.1 x10E3/uL  
 ABS. MONOCYTES 0.3 0.1 - 0.9 x10E3/uL  
 ABS. EOSINOPHILS 0.1 0.0 - 0.4 x10E3/uL  
 ABS. BASOPHILS 0.0 0.0 - 0.2 x10E3/uL IMMATURE GRANULOCYTES 0 Not Estab. %  
 ABS. IMM. GRANS. 0.0 0.0 - 0.1 x10E3/uL Narrative Performed at:  [address]  [id number] : Charmayne Siskin MD, Phone:  [phone] METABOLIC PANEL, COMPREHENSIVE Result Value Ref Range Glucose 97 65 - 99 mg/dL BUN 8 6 - 24 mg/dL Creatinine 0.84 0.57 - 1.00 mg/dL GFR est non-AA 84 >59 mL/min/1.73 GFR est AA 96 >59 mL/min/1.73  
 BUN/Creatinine ratio 10 9 - 23 Sodium 137 134 - 144 mmol/L Potassium 4.4 3.5 - 5.2 mmol/L Chloride 105 96 - 106 mmol/L  
 CO2 21 20 - 29 mmol/L Calcium 8.9 8.7 - 10.2 mg/dL Protein, total 6.8 6.0 - 8.5 g/dL Albumin 3.8 3.5 - 5.5 g/dL GLOBULIN, TOTAL 3.0 1.5 - 4.5 g/dL A-G Ratio 1.3 1.2 - 2.2 Bilirubin, total 0.2 0.0 - 1.2 mg/dL Alk. phosphatase 78 39 - 117 IU/L  
 AST (SGOT) 19 0 - 40 IU/L  
 ALT (SGPT) 15 0 - 32 IU/L Narrative Performed at:  [address]  [id number] : Jordan Orantes MD, Phone:  [phone] LIPID PANEL Result Value Ref Range Cholesterol, total 167 100 - 199 mg/dL Triglyceride 96 0 - 149 mg/dL HDL Cholesterol 60 >39 mg/dL VLDL, calculated 19 5 - 40 mg/dL LDL, calculated 88 0 - 99 mg/dL Narrative Performed at:  [address]  [id number] : Jordan Orantes MD, Phone:  [phone]

## 2019-02-23 LAB
AGE GDLN ACOG TESTING, 191185: NORMAL
C TRACH RRNA CVX QL NAA+PROBE: NEGATIVE
CYTOLOGIST CVX/VAG CYTO: NORMAL
CYTOLOGY CVX/VAG DOC THIN PREP: NORMAL
DX ICD CODE: NORMAL
HPV I/H RISK 4 DNA CVX QL PROBE+SIG AMP: NEGATIVE
Lab: NORMAL
Lab: NORMAL
N GONORRHOEA RRNA CVX QL NAA+PROBE: NEGATIVE
OTHER STN SPEC: NORMAL
PATH REPORT.FINAL DX SPEC: NORMAL
STAT OF ADQ CVX/VAG CYTO-IMP: NORMAL

## 2019-03-21 ENCOUNTER — OFFICE VISIT (OUTPATIENT)
Dept: FAMILY MEDICINE CLINIC | Age: 49
End: 2019-03-21

## 2019-03-21 VITALS
HEIGHT: 69 IN | WEIGHT: 259 LBS | BODY MASS INDEX: 38.36 KG/M2 | RESPIRATION RATE: 16 BRPM | OXYGEN SATURATION: 94 % | TEMPERATURE: 98.8 F | SYSTOLIC BLOOD PRESSURE: 123 MMHG | HEART RATE: 85 BPM | DIASTOLIC BLOOD PRESSURE: 89 MMHG

## 2019-03-21 DIAGNOSIS — G43.109 MIGRAINE WITH AURA AND WITHOUT STATUS MIGRAINOSUS, NOT INTRACTABLE: ICD-10-CM

## 2019-03-21 DIAGNOSIS — F33.1 MODERATE EPISODE OF RECURRENT MAJOR DEPRESSIVE DISORDER (HCC): Primary | ICD-10-CM

## 2019-03-21 NOTE — PROGRESS NOTES
Subjective  Carissa Cho is an 57 y.o. female who presents for headaches and depression     HPI  Headaches  Duration: adolescence  Type: patient states that she has not been told which type  Location:  Left frontal radiating to back of neck  Severity: can be 9/10 at it's worst, no headache right now  Character: sharp  Aura: reports seeing spots just prior to and during headache  Timing: can last up to 4 days, but usually only one day  Frequency: 1x per week  Setting: no particular setting  Triggers: weather changes  Treatment: advil and tylenol, patient was recently prescribed imitrex, which initially helped her but then did not seem  To help  Neurologist: has never been evaluated by neurology in the past  Interval History:  3/21/2019: Patient last seen for symptoms on 2/20/2019. At that time she reported that Imitrex initially helped, but it was not completely effective. She was given referral to neurology. She has not yet scheduled an appointment for this.      Depression  Duration: 1 year  Medicine: recenly started on Celexa  Prior Medicines: bupropion in the past, but it was stopped d/t interaction with Tamoxifen. Patient states that she spoke with her oncologist, who stated that she could be on Celexa or Effexor  SI: denies any SI or HI  Symptoms: manifested by fellings of sadness or easily agitated  Effects on Life: affects work life; patient lives by herself  PHQ-9: 10 (moderate depression)  Social Support: patient feels that she has good social support from family (sisters)  Sexual Activity: patient is sexually active and uses paraguard IUD (reports that she is due for removal in 2021)  Interval History:  3/21/2019: Patient reports that the Celexa has been helpful, but she has not yet experienced relief of symptoms. 2/20/2019: patient was recently seen on 2/72019. At that time she was placed on Celexa. She reports that she has not noticed in change in her symptoms.  She denies any SI or worsening in symptoms. ROS  Constitutional: negative for fevers/chills, night sweats, or fatigue  Cardio: negative for chest pain or palpitations  Resp: negative for dyspnea or wheezing      Allergies - reviewed: Allergies   Allergen Reactions    Adhesive Unknown (comments)         Medications - reviewed:   Current Outpatient Medications   Medication Sig    tamoxifen (NOLVADEX) 20 mg tablet Take 20 mg by mouth daily.  SUMAtriptan (IMITREX) 20 mg/actuation nasal spray 1 [place] by Right Nostril route as needed for Migraine.  citalopram (CELEXA) 10 mg tablet Take 1 Tab by mouth daily. No current facility-administered medications for this visit.           Past Medical History - reviewed:  Past Medical History:   Diagnosis Date    Breast cancer ([facility] [address] 2011    IN REMISSION    Papanicolaou smear 2013    DR LIND        ABNORMAL         Past Surgical History - reviewed:   Past Surgical History:   Procedure Laterality Date    HX BREAST LUMPECTOMY      RIGHT    HX CHOLECYSTECTOMY  2016    TX ANESTH, HEART SURG < AGE 3           Social History - reviewed:  Social History     Socioeconomic History    Marital status: SINGLE     Spouse name: Not on file    Number of children: Not on file    Years of education: Not on file    Highest education level: Not on file   Occupational History    Not on file   Social Needs    Financial resource strain: Not on file    Food insecurity:     Worry: Not on file     Inability: Not on file    Transportation needs:     Medical: Not on file     Non-medical: Not on file   Tobacco Use    Smoking status: Current Every Day Smoker    Smokeless tobacco: Never Used   Substance and Sexual Activity    Alcohol use: No     Frequency: Never    Drug use: No    Sexual activity: Never   Lifestyle    Physical activity:     Days per week: Not on file     Minutes per session: Not on file    Stress: Not on file   Relationships    Social connections:     Talks on phone: Not on file     Gets together: Not on file     Attends Denominational service: Not on file     Active member of club or organization: Not on file     Attends meetings of clubs or organizations: Not on file     Relationship status: Not on file    Intimate partner violence:     Fear of current or ex partner: Not on file     Emotionally abused: Not on file     Physically abused: Not on file     Forced sexual activity: Not on file   Other Topics Concern    Not on file   Social History Narrative    Not on file         Family History - reviewed:  Family History   Problem Relation Age of Onset    Hypertension Mother     Thyroid Disease Mother         hypothyroidism    Coronary Artery Disease Father     Diabetes Father          Visit Vitals  /89 (BP 1 Location: Left arm, BP Patient Position: Sitting)   Pulse 85   Temp 98.8 °F (37.1 °C) (Oral)   Resp 16   Ht 5' 9\" (1.753 m)   Wt 259 lb (117.5 kg)   SpO2 94%   BMI 38.25 kg/m²     Physical Exam   Const: NAD, sitting comfortably in chair  Eyes:  EOMI, MMM, PERRLA  ENT: gross hearing intact, septum midline, nares patent, oropharynx moist without exudate   Cardiovascular: no m/g/r; normal rhythym, regular rate, radial pulse intact blt  Resp: no wheezing or  crackles, LCAB; no accessory muscle use  Abd: normoactive bowel sounds, no TTP  PSYCHIATRIC: Orientation: alert and oriented x 3; ; Mood/Affect: normal mood and affect;  Speech Pattern: normal;  Thought Processes: normal rate and content of thoughts; good abstract reasoning;  Associations: intact thought associations; Abnormal Thoughts: no hallucinations, delusions, obsessions, or suicidal/homicidal ideation; Insight/Judgment: good insight; good judgment;        Assessment/Plan  1. Moderate episode of recurrent major depressive disorder ([facility] [address]): no SI. Patient has experienced some improvement with Celexa, but she has not yet had complete resolution of symptoms. Discussed increasing dose of celexa vs. Switching to another antidepressant. Given comorbid hx of migraines, patient would like to consider switching to Amitriptyline for treatment of depression and migraine prevention. She states that she would like to discuss this with her oncologist at [facility] beforehand.   - will consider switching from Celexa to Amitriptyline  - discussed recommendations for counseling     2. Migraine with aura and without status migrainosus, not intractable: Akilah Nevarezcarlo Discussed adding Amitriptyline to current regimen for migraine prevention (see discussion above). - continue PRN Imitrex  - will consider adding amitriptyline after patient discusses this with her oncologist      Follow-up and Dispositions    · Return in about 6 weeks (around 5/2/2019) for depression, migraine. I have discussed the diagnosis with the patient and the intended plan as seen in the above orders. Patient verbalized understanding of the plan and agrees with the plan. The patient has received an after-visit summary and questions were answered concerning future plans. I have discussed medication side effects and warnings with the patient as well. Informed patient to return to the office if new symptoms arise.         Mason Maher MD  Family Medicine Resident

## 2019-03-21 NOTE — PROGRESS NOTES
Identified pt with two pt identifiers(name and ). Chief Complaint   Patient presents with    Follow-up     4 week follow up for depression - reports doing the same as previous visit  PHQ9 14        Health Maintenance Due   Topic    Pneumococcal 0-64 years (1 of 1 - PPSV23)    DTaP/Tdap/Td series (1 - Tdap)       Wt Readings from Last 3 Encounters:   19 264 lb (119.7 kg)   19 263 lb (119.3 kg)     Temp Readings from Last 3 Encounters:   19 98 °F (36.7 °C) (Oral)   19 99.3 °F (37.4 °C) (Oral)     BP Readings from Last 3 Encounters:   19 133/85   19 117/82     Pulse Readings from Last 3 Encounters:   19 83   19 (!) 107         Learning Assessment:  :     No flowsheet data found. Depression Screening:  :     3 most recent PHQ Screens 3/21/2019   Little interest or pleasure in doing things Several days   Feeling down, depressed, irritable, or hopeless Nearly every day   Total Score PHQ 2 4   Trouble falling or staying asleep, or sleeping too much Nearly every day   Feeling tired or having little energy Nearly every day   Poor appetite, weight loss, or overeating More than half the days   Feeling bad about yourself - or that you are a failure or have let yourself or your family down More than half the days   Trouble concentrating on things such as school, work, reading, or watching TV Not at all   Moving or speaking so slowly that other people could have noticed; or the opposite being so fidgety that others notice Not at all   Thoughts of being better off dead, or hurting yourself in some way Not at all   PHQ 9 Score 14   How difficult have these problems made it for you to do your work, take care of your home and get along with others Extremely difficult       Fall Risk Assessment:  :     No flowsheet data found. Abuse Screening:  :     No flowsheet data found.     Coordination of Care Questionnaire:  :     1) Have you been to an emergency room, urgent care clinic since your last visit? no   Hospitalized since your last visit? no             2) Have you seen or consulted any other health care providers outside of [address] [place] [address] since your last visit? no  (Include any pap smears or colon screenings in this section.)    3) Do you have an Advance Directive on file? no  Are you interested in receiving information about Advance Directives? no    Patient is accompanied by self I have received verbal consent from Marcelino Degroot to discuss any/all medical information while they are present in the room. Reviewed record in preparation for visit and have obtained necessary documentation. Medication reconciliation up to date and corrected with patient at this time.

## 2019-03-21 NOTE — PATIENT INSTRUCTIONS
Amitriptyline (By mouth)   Amitriptyline (am-i-TRIP-ti-evelyn)  Treats depression. This medicine is a TCA. Brand Name(s): Elavil   There may be other brand names for this medicine. When This Medicine Should Not Be Used: This medicine is not right for everyone. Do not use if you had an allergic reaction to amitriptyline. How to Use This Medicine:   Tablet  · Take your medicine as directed. Your dose may need to be changed several times to find what works best for you. · This medicine should come with a Medication Guide. Ask your pharmacist for a copy if you do not have one. · Store the medicine in a closed container at room temperature, away from heat, moisture, and direct light. · Missed dose: Take a dose as soon as you remember. If it is almost time for your next dose, wait until then and take a regular dose. Do not take extra medicine to make up for a missed dose. Drugs and Foods to Avoid:   Ask your doctor or pharmacist before using any other medicine, including over-the-counter medicines, vitamins, and herbal products. · Do not use this medicine with cisapride. Do not use this medicine and an MAO inhibitor (MAOI) within 14 days of each other. · Some medicines and foods can affect how amitriptyline works. Tell your doctor if you are using cimetidine, disulfiram, or guanethidine. Tell your doctor if you are using other medicine for depression (such as fluoxetine, paroxetine, sertraline), a phenothiazine medicine (such as promethazine, chlorpromazine), medicine for heart rhythm problems (such as flecainide, propafenone, quinidine), or thyroid medicine. · Tell your doctor if you use anything else that makes you sleepy. Some examples are allergy medicine, narcotic pain medicine, and alcohol.   Warnings While Using This Medicine:   · Tell your doctor if you are pregnant or breastfeeding, or if you have liver disease, heart disease, diabetes, narrow-angle glaucoma, a seizure disorder, a thyroid problem, or trouble urinating. · For some children, teenagers, and young adults, this medicine may increase mental or emotional problems. This may lead to thoughts of suicide and violence. Talk with your doctor right away if you have any thoughts or behavior changes that concern you. Tell your doctor if you or anyone in your family has a history of bipolar disorder or suicide attempts. · This medicine may cause the following problems:   ¨ Heart rhythm problems  ¨ High or low blood sugar levels  · This medicine may make you dizzy or drowsy. Do not drive or do anything else that could be dangerous until you know how this medicine affects you. · Do not stop using this medicine suddenly. Your doctor will need to slowly decrease your dose before you stop it completely. · Keep all medicine out of the reach of children. Never share your medicine with anyone. Possible Side Effects While Using This Medicine:   Call your doctor right away if you notice any of these side effects:  · Allergic reaction: Itching or hives, swelling in your face or hands, swelling or tingling in your mouth or throat, chest tightness, trouble breathing  · Anxiety, restlessness, seeing or hearing things that are not there  · Chest pain, trouble breathing  · Fast, pounding, or uneven heartbeat  · Feeling more excited or energetic than usual, racing thoughts, trouble sleeping  · Lightheadedness, dizziness, or fainting  · Seizures  · Thoughts of hurting yourself or others, unusual behavior  · Unusual bleeding or bruising  If you notice these less serious side effects, talk with your doctor:   · Blurred vision, dry mouth, fever  · Change in how much or how often you urinate  · Constipation, diarrhea, nausea, vomiting  · Drowsiness, sleepiness  · Sexual problems  If you notice other side effects that you think are caused by this medicine, tell your doctor. Call your doctor for medical advice about side effects.  You may report side effects to FDA at 1-800-FDA-1088  © 2017 [employer] Information is for End User's use only and may not be sold, redistributed or otherwise used for commercial purposes. The above information is an  only. It is not intended as medical advice for individual conditions or treatments. Talk to your doctor, nurse or pharmacist before following any medical regimen to see if it is safe and effective for you. Learning about Antidepressants  What are antidepressants? Antidepressants are medicines that change the levels of some chemicals in the brain. They can help affect moods. There are different types that work on brain chemicals in different ways. These medicines can help treat depression. They are also used for anxiety, eating disorders, post-traumatic stress disorder, and chronic pain. What are some examples? Here are some examples of antidepressants. For each item in the list, the generic name is first, followed by any brand names. · amitriptyline  · bupropion (Wellbutrin)  · citalopram (Celexa)  · fluoxetine (Prozac)  · sertraline (Zoloft)  · venlafaxine (Effexor)  This is not a complete list of antidepressants. What are the side effects? Side effects may vary. They depend on the medicine you take. But common ones include:  · Nausea. · Dry mouth. · Loss of appetite. · Diarrhea or constipation. · Sexual problems. (These may include loss of desire or erection problems.)  · Headaches. · Trouble falling asleep. Or you may wake up a lot at night. · Weight gain. · Feeling nervous or on edge. · Feeling drowsy in the daytime. Problems with sexual arousal and a lack of interest in sex are common side effects. If this happens to you, talk to your doctor. There are other medicines that may help with these problems. Mild side effects may go away after you take the medicine for a few weeks. If the side effects bother you, talk with your doctor. He or she may prescribe a different medicine.  Or the doctor may suggest ways to manage your side effects. How do you take antidepressants safely? If your doctor has prescribed antidepressants, here are some tips to help you get the most from your medicine. · Share your health history with your doctor. Tell your doctor about your other medicines and health conditions. This information can affect which antidepressant your doctor prescribes for you. Tell your doctor if you or anyone in your family has had bipolar disorder or used antidepressants before. · Take your medicine as prescribed. It works best and has fewer side effects when you take it exactly as your doctor prescribed. If you miss a dose, and if it's not too late in the day, it's okay to take it. Don't double up doses. · Keep taking your medicine for a while. Taking it for at least 6 months after you feel better can help keep you from getting symptoms again. · Be prepared to try different medicines and doses. You may start to feel better within 1 to 3 weeks after you start the medicine. If you have not improved at all in 3 weeks, your doctor may increase your dose. Or you may need to try a different medicine. Over time, your doctor may need to adjust your dose again to manage your symptoms better. · Don't take any new medicines unless you talk to your doctor first.   Even common medicines like aspirin and some vitamins and herbs can cause problems if you use them while you take antidepressants. · Do not drink alcohol. It can make the side effects worse. · Don't stop taking your medicine on your own. Quitting antidepressants too quickly can cause withdrawal symptoms. It can also cause depression to return. If you are having a problem with your medicine or are ready to quit taking it, work with your doctor. He or she can help you to slowly reduce the dose over a span of a few weeks. · Call your doctor right away for serious side effects. Examples include:  ? Warning signs of suicide. These include talking or writing about death, giving away belongings, or withdrawing from family and friends. ? Manic behavior. This includes having very high energy, sleeping less than normal, being impulsive, or being grouchy or restless. How might you feel about taking antidepressants? You may feel nervous, relieved, or a little surprised that your doctor is talking to you about antidepressants. And the thought of needing to take medicine for a long time can be scary. But whether you are depressed or you have another condition, you are taking a step to help yourself feel better. Follow-up care is a key part of your treatment and safety. Be sure to make and go to all appointments, and call your doctor if you are having problems. It's also a good idea to know your test results and keep a list of the medicines you take. Where can you learn more? Go to http://tashiBaike.compamela.info/. Enter A230 in the search box to learn more about \"Learning about Antidepressants. \"  Current as of: September 11, 2018  Content Version: 11.9  © 7561-1032 [employer]. Care instructions adapted under license by [employer] (which disclaims liability or warranty for this information). If you have questions about a medical condition or this instruction, always ask your healthcare professional. [address] any warranty or liability for your use of this information. Amitriptyline (Sentravil PM-25) - (By mouth)   Why this medicine is used:   Treats depression.   Contact a nurse or doctor right away if you have:  · Fast, pounding, or uneven heartbeat  · Lightheadedness, dizziness, fainting  · Seizures  · Thoughts of hurting yourself or others  · Anxiety, restlessness, seeing or hearing things that are not there     Common side effects:  · Blurred vision, dry mouth  · Drowsiness, sleepiness  · Constipation, nausea, vomiting  © 2017 [facility] Information is for End User's use only and may not be sold, redistributed or otherwise used for commercial purposes.

## 2019-03-26 NOTE — PROGRESS NOTES
[address] Medicine Residency Attending Addendum:  Patient encounter was discussed on the day of the encounter with Terri Chanel MD, performing the key elements of the service. I discussed the findings, assessment and plan with the resident and agree with the resident's findings and plan as documented in the resident's note.       Kath Medrano MD, CAQSM, RMSK

## 2019-03-28 ENCOUNTER — TELEPHONE (OUTPATIENT)
Dept: FAMILY MEDICINE CLINIC | Age: 49
End: 2019-03-28

## 2019-03-28 DIAGNOSIS — F33.1 MODERATE EPISODE OF RECURRENT MAJOR DEPRESSIVE DISORDER (HCC): Primary | ICD-10-CM

## 2019-03-28 DIAGNOSIS — G43.109 MIGRAINE WITH AURA AND WITHOUT STATUS MIGRAINOSUS, NOT INTRACTABLE: ICD-10-CM

## 2019-04-11 NOTE — TELEPHONE ENCOUNTER
04/11/19  Attempted to call patient . No answer. Left VM. Will try again later. Ra Bañuelos MD       --------    04/25/19    Attempted to call patient . No answer. Left VM. Will try again later.      Ra Bañuelos MD     ---------

## 2019-04-25 RX ORDER — AMITRIPTYLINE HYDROCHLORIDE 10 MG/1
10 TABLET, FILM COATED ORAL
Qty: 90 TAB | Refills: 1 | Status: SHIPPED | OUTPATIENT
Start: 2019-04-25 | End: 2019-12-03 | Stop reason: SDUPTHER

## 2019-04-25 NOTE — TELEPHONE ENCOUNTER
04/25/19  3:40 PM    1. Moderate episode of recurrent major depressive disorder (HCC)  - amitriptyline (ELAVIL) 10 mg tablet; Take 1 Tab by mouth nightly. Indications: depression  Dispense: 90 Tab; Refill: 1    2. Migraine with aura and without status migrainosus, not intractable  - amitriptyline (ELAVIL) 10 mg tablet; Take 1 Tab by mouth nightly. Indications: depression  Dispense: 90 Tab; Refill: 1    Spoke with patient regarding switching from celexa to amitriptyline for depression and migraine prevention. Discussed taper schedule of celexa to stop then starting amitriptyline.      Wade Partida MD

## 2019-05-21 ENCOUNTER — OFFICE VISIT (OUTPATIENT)
Dept: NEUROLOGY | Age: 49
End: 2019-05-21

## 2019-05-21 VITALS
RESPIRATION RATE: 20 BRPM | HEIGHT: 69 IN | HEART RATE: 89 BPM | SYSTOLIC BLOOD PRESSURE: 138 MMHG | OXYGEN SATURATION: 98 % | BODY MASS INDEX: 38.36 KG/M2 | DIASTOLIC BLOOD PRESSURE: 94 MMHG | WEIGHT: 259 LBS

## 2019-05-21 DIAGNOSIS — G43.009 MIGRAINE WITHOUT AURA AND WITHOUT STATUS MIGRAINOSUS, NOT INTRACTABLE: Primary | ICD-10-CM

## 2019-05-21 DIAGNOSIS — R51.9 FREQUENT HEADACHES: ICD-10-CM

## 2019-05-21 DIAGNOSIS — Z82.49 FAMILY HISTORY OF CEREBRAL ANEURYSM: ICD-10-CM

## 2019-05-21 RX ORDER — SUMATRIPTAN SUCCINATE 4 MG/.5ML
INJECTION, SOLUTION SUBCUTANEOUS
Qty: 18 KIT | Refills: 0 | Status: SHIPPED | OUTPATIENT
Start: 2019-05-21

## 2019-05-21 RX ORDER — ONDANSETRON 4 MG/1
TABLET, ORALLY DISINTEGRATING ORAL
Qty: 48 TAB | Refills: 0 | Status: SHIPPED | OUTPATIENT
Start: 2019-05-21

## 2019-05-21 NOTE — PROGRESS NOTES
Patient is here as a New Patient for headaches,     She states that she gets about 12/ month, averaging 3/wk. Has been getting headaches for a while, have gotten worse over this past year. She has tried OTC medications as well (most of the time will not work). No other concerns at this time.

## 2019-05-21 NOTE — PROGRESS NOTES
Name:  Delisa Holcomb      :  1970    PCP:   Mayur Denton MD      Referring:  Laura Escalera MD/ [facility]   MRN:   [id number]     Chief Complaint:   Chief Complaint   Patient presents with    Headache     New Patient       HISTORY OF PRESENT ILLNESS:     This is a 57 y.o. female with PMHx Anxiety, BRCA (in remission, s/p right breast lumpectomy), Depression, headaches, occasional nausea/ vomiting, who presents for evaluation of frequent headaches. She describes having headaches as far back as teenage years but over the past 1-2 years headaches have become more frequent and more intense. She notes that her depression over the past year has made headaches worse. She also notes that changes in weather and lack of sleep can trigger headaches. Doesn't recall any head injury before the headaches got worse 2 years ago. Typically starts around/ behind left eye, then spreads down face, up forehead, to back of head and down neck. Stabbing, punching sensation. + nausea, + frequent vomiting, occasional light and sound sensitivity. Denies any visual aura associated with her headaches. Headaches only rarely wake her up during sleep/ night time. Typical duration: almost full day, lasts till next morning. Taking a headache pain reliever 4 days a week (advil or tylenol). Pt recalls having a Brain MRI in the  for headaches and says it was normal.      Pt reports that her older sister (2 years) was diagnosed with cerebral aneurysm at age 54 yo, underwent brain surgery in [place], [place]. Pt hasn't had any brain imaging since last MRI in late . # of headache days a week: 4-5 days   # of migraine days a week: 1-3    Recently got Rx for Amitriptyline 10 mg QHS, hasn't started it yet due to tapering off another antidepressant. Never tried topamax, depakote, verapamil, or propranolol.   Tried/ failed: fioricet (, didn't help), sumatriptan nasal spray 20 mg (helped first time she used, didn't help other times; Feb-March 2019)    Hasn't tried: Sumatriptan tablet, rizatriptan, zomig nasal spray or tablet, frovatriptan, sumatriptan injector      Complete Review of Systems: + anxiety, depression, headaches, nausea/ vomiting \"sometimes,\" tinnitus, weight changes; otherwise as noted in HPI     Allergies   Allergen Reactions    Adhesive Unknown (comments)     Past Medical History:   Diagnosis Date    Anxiety     Breast cancer ([facility] [address] 2011    IN REMISSION    Depression 2018    For 1 year    Headache     Nausea and vomiting     Will only happen sometimes    Papanicolaou smear 2013    DR LIND        ABNORMAL     Current Outpatient Medications   Medication Sig Dispense Refill    SUMAtriptan succinate 4 mg/0.5 mL pnij One 4 mg injection subcutaneous at onset of migraine. Limit use to 2 days per week. 90 day prescription. 18 Kit 0    ondansetron (ZOFRAN ODT) 4 mg disintegrating tablet Take one tablet at onset of migraine-related nausea. Repeat 1 tab in 6 hours if needed. Limit use to 2 days per week. 90 day prescription. 48 Tab 0    amitriptyline (ELAVIL) 10 mg tablet Take 1 Tab by mouth nightly. Indications: depression 90 Tab 1    tamoxifen (NOLVADEX) 20 mg tablet Take 20 mg by mouth daily.        Past Surgical History:   Procedure Laterality Date    HX BREAST LUMPECTOMY      RIGHT    HX CHOLECYSTECTOMY  2016    ID ANESTH, HEART SURG < AGE 3       Family History   Problem Relation Age of Onset    Hypertension Mother     Thyroid Disease Mother         hypothyroidism    Seizures Mother     Headache Mother     Coronary Artery Disease Father     Diabetes Father     Cancer Father     Heart Disease Father     Neuropathy Father      Social History     Socioeconomic History    Marital status: SINGLE     Spouse name: Not on file    Number of children: Not on file    Years of education: Not on file    Highest education level: Not on file   Occupational History    Not on file Social Needs    Financial resource strain: Not on file    Food insecurity:     Worry: Not on file     Inability: Not on file    Transportation needs:     Medical: Not on file     Non-medical: Not on file   Tobacco Use    Smoking status: Current Every Day Smoker    Smokeless tobacco: Never Used   Substance and Sexual Activity    Alcohol use: No     Frequency: Never    Drug use: No    Sexual activity: Never   Lifestyle    Physical activity:     Days per week: Not on file     Minutes per session: Not on file    Stress: Not on file   Relationships    Social connections:     Talks on phone: Not on file     Gets together: Not on file     Attends Faith service: Not on file     Active member of club or organization: Not on file     Attends meetings of clubs or organizations: Not on file     Relationship status: Not on file    Intimate partner violence:     Fear of current or ex partner: Not on file     Emotionally abused: Not on file     Physically abused: Not on file     Forced sexual activity: Not on file   Other Topics Concern    Not on file   Social History Narrative    Not on file       PHYSICAL EXAM  Vitals:    05/21/19 1255   BP: (!) 138/94   BP 1 Location: Left arm   BP Patient Position: Sitting   Pulse: 89   Resp: 20   SpO2: 98%   Weight: 117.5 kg (259 lb)   Height: 5' 9\" (1.753 m)       General:  Alert, cooperative, NAD   Head:  Normocephalic, atraumatic. Eyes:  Conjunctivae/corneas clear   Lungs:  Heart:  Non labored breathing  Regular rate, rhythm   Extremities: No edema.    Skin: No rashes    Neurologic Exam       Language: normal  Memory:  Alert, oriented to person, place, situation    Cranial Nerves:  I: smell Not tested   II: visual fields Full to confrontation   II: pupils Equal, round, reactive to light   II: optic disc No papilledema   III,VII: ptosis none   III,IV,VI: extraocular muscles  normal   V: facial light touch sensation  normal   VII: facial muscle function  symmetric VIII: hearing symmetric   IX: soft palate elevation  normal   XI: sternocleidomastoid strength 5/5   XII: tongue  midline      Motor: normal bulk, tone, strength in all exts  Sensory: intact to LT, PP, temp, vibration x 4 exts   Cerebellar: no rest, postural, or intention tremor  Normal FNF and H-Shin bilaterally  Reflexes: 2+ throughout  Plantar response: neutral bilaterally    Gait: normal gait including tandem  Romberg negative       ASSESSMENT AND PLAN    ICD-10-CM ICD-9-CM    1. Migraine without aura and without status migrainosus, not intractable G43.009 346.10 SUMAtriptan succinate 4 mg/0.5 mL pnij      CTA HEAD NECK W CONT      ondansetron (ZOFRAN ODT) 4 mg disintegrating tablet   2. Family history of cerebral aneurysm Z82.49 V17.1 CTA HEAD NECK W CONT   3. Frequent headaches R51 784.0 CTA HEAD NECK W CONT       1. Pt to start and titrate up on Amitriptyline (for depression and HA prevention) as directed by PCP. 2). Pt has significant nausea and occasional vomiting associated with her migraines. Rx'd Zofran 4 mg ODT to take for migraine-related nausea, and snt Rx for Sumatriptan 4 mg SQ pen-injector to use up to 2 days a week prn migraine. 3) Ordered CTA Head/ Neck to rule out cerebral aneurysms as pt has sister who was diagnosed with cerebral aneurysms around pt's current age and sister had to undergo brain surgery to treat those (multiple). Follow up in 6 weeks to go over CTA and reassess headache status/ frequency        Thank you for allowing me to be a part of your patient's care. Please call me at [phone] if you have any questions.      Sincerely,  Teri Ponce MD